# Patient Record
Sex: FEMALE | Race: BLACK OR AFRICAN AMERICAN
[De-identification: names, ages, dates, MRNs, and addresses within clinical notes are randomized per-mention and may not be internally consistent; named-entity substitution may affect disease eponyms.]

---

## 2019-03-19 ENCOUNTER — HOSPITAL ENCOUNTER (EMERGENCY)
Dept: HOSPITAL 92 - ERS | Age: 34
Discharge: HOME | End: 2019-03-19
Payer: COMMERCIAL

## 2019-03-19 ENCOUNTER — HOSPITAL ENCOUNTER (OUTPATIENT)
Dept: HOSPITAL 92 - ERS | Age: 34
Discharge: HOME | End: 2019-03-19
Attending: SURGERY
Payer: SELF-PAY

## 2019-03-19 DIAGNOSIS — K80.11: Primary | ICD-10-CM

## 2019-03-19 DIAGNOSIS — K80.20: Primary | ICD-10-CM

## 2019-03-19 LAB
ALBUMIN SERPL BCG-MCNC: 3.9 G/DL (ref 3.5–5)
ALBUMIN SERPL BCG-MCNC: 4 G/DL (ref 3.5–5)
ALP SERPL-CCNC: 78 U/L (ref 40–150)
ALP SERPL-CCNC: 79 U/L (ref 40–150)
ALT SERPL W P-5'-P-CCNC: 12 U/L (ref 8–55)
ALT SERPL W P-5'-P-CCNC: 13 U/L (ref 8–55)
ANION GAP SERPL CALC-SCNC: 13 MMOL/L (ref 10–20)
ANION GAP SERPL CALC-SCNC: 15 MMOL/L (ref 10–20)
AST SERPL-CCNC: 13 U/L (ref 5–34)
AST SERPL-CCNC: 13 U/L (ref 5–34)
BASOPHILS # BLD AUTO: 0 THOU/UL (ref 0–0.2)
BASOPHILS NFR BLD AUTO: 0.1 % (ref 0–1)
BILIRUB SERPL-MCNC: 0.2 MG/DL (ref 0.2–1.2)
BILIRUB SERPL-MCNC: 0.2 MG/DL (ref 0.2–1.2)
BUN SERPL-MCNC: 6 MG/DL (ref 7–18.7)
BUN SERPL-MCNC: 9 MG/DL (ref 7–18.7)
CALCIUM SERPL-MCNC: 8.7 MG/DL (ref 7.8–10.44)
CALCIUM SERPL-MCNC: 9.4 MG/DL (ref 7.8–10.44)
CHLORIDE SERPL-SCNC: 107 MMOL/L (ref 98–107)
CHLORIDE SERPL-SCNC: 107 MMOL/L (ref 98–107)
CO2 SERPL-SCNC: 18 MMOL/L (ref 22–29)
CO2 SERPL-SCNC: 19 MMOL/L (ref 22–29)
CREAT CL PREDICTED SERPL C-G-VRATE: 0 ML/MIN (ref 70–130)
CREAT CL PREDICTED SERPL C-G-VRATE: 0 ML/MIN (ref 70–130)
EOSINOPHIL # BLD AUTO: 0 THOU/UL (ref 0–0.7)
EOSINOPHIL NFR BLD AUTO: 0.2 % (ref 0–10)
GLOBULIN SER CALC-MCNC: 3.3 G/DL (ref 2.4–3.5)
GLOBULIN SER CALC-MCNC: 3.5 G/DL (ref 2.4–3.5)
GLUCOSE SERPL-MCNC: 115 MG/DL (ref 70–105)
GLUCOSE SERPL-MCNC: 124 MG/DL (ref 70–105)
HGB BLD-MCNC: 13.7 G/DL (ref 12–16)
HGB BLD-MCNC: 14.6 G/DL (ref 12–16)
LIPASE SERPL-CCNC: 4 U/L (ref 8–78)
LIPASE SERPL-CCNC: 9 U/L (ref 8–78)
LYMPHOCYTES # BLD: 1.6 THOU/UL (ref 1.2–3.4)
LYMPHOCYTES NFR BLD AUTO: 14.7 % (ref 21–51)
MCH RBC QN AUTO: 30.2 PG (ref 27–31)
MCH RBC QN AUTO: 30.4 PG (ref 27–31)
MCV RBC AUTO: 88.5 FL (ref 78–98)
MCV RBC AUTO: 90.2 FL (ref 78–98)
MDIFF COMPLETE?: YES
MONOCYTES # BLD AUTO: 0.5 THOU/UL (ref 0.11–0.59)
MONOCYTES NFR BLD AUTO: 4 % (ref 0–10)
NEUTROPHILS # BLD AUTO: 9 THOU/UL (ref 1.4–6.5)
NEUTROPHILS NFR BLD AUTO: 80.9 % (ref 42–75)
PLATELET # BLD AUTO: 375 THOU/UL (ref 130–400)
PLATELET # BLD AUTO: 412 THOU/UL (ref 130–400)
POTASSIUM SERPL-SCNC: 3.3 MMOL/L (ref 3.5–5.1)
POTASSIUM SERPL-SCNC: 3.8 MMOL/L (ref 3.5–5.1)
PREGS CONTROL BACKGROUND?: (no result)
PREGS CONTROL BACKGROUND?: (no result)
PREGS CONTROL BAR APPEAR?: YES
PREGS CONTROL BAR APPEAR?: YES
RBC # BLD AUTO: 4.55 MILL/UL (ref 4.2–5.4)
RBC # BLD AUTO: 4.8 MILL/UL (ref 4.2–5.4)
SODIUM SERPL-SCNC: 135 MMOL/L (ref 136–145)
SODIUM SERPL-SCNC: 137 MMOL/L (ref 136–145)
WBC # BLD AUTO: 10.3 THOU/UL (ref 4.8–10.8)
WBC # BLD AUTO: 11.1 THOU/UL (ref 4.8–10.8)

## 2019-03-19 PROCEDURE — 84703 CHORIONIC GONADOTROPIN ASSAY: CPT

## 2019-03-19 PROCEDURE — 85025 COMPLETE CBC W/AUTO DIFF WBC: CPT

## 2019-03-19 PROCEDURE — 83690 ASSAY OF LIPASE: CPT

## 2019-03-19 PROCEDURE — 96374 THER/PROPH/DIAG INJ IV PUSH: CPT

## 2019-03-19 PROCEDURE — 96376 TX/PRO/DX INJ SAME DRUG ADON: CPT

## 2019-03-19 PROCEDURE — 96375 TX/PRO/DX INJ NEW DRUG ADDON: CPT

## 2019-03-19 PROCEDURE — 76705 ECHO EXAM OF ABDOMEN: CPT

## 2019-03-19 PROCEDURE — 96361 HYDRATE IV INFUSION ADD-ON: CPT

## 2019-03-19 PROCEDURE — S0028 INJECTION, FAMOTIDINE, 20 MG: HCPCS

## 2019-03-19 PROCEDURE — 80053 COMPREHEN METABOLIC PANEL: CPT

## 2019-03-19 PROCEDURE — 0FT44ZZ RESECTION OF GALLBLADDER, PERCUTANEOUS ENDOSCOPIC APPROACH: ICD-10-PCS | Performed by: SURGERY

## 2019-03-19 PROCEDURE — 96365 THER/PROPH/DIAG IV INF INIT: CPT

## 2019-03-19 PROCEDURE — 36415 COLL VENOUS BLD VENIPUNCTURE: CPT

## 2019-03-19 PROCEDURE — 88304 TISSUE EXAM BY PATHOLOGIST: CPT

## 2019-03-19 NOTE — HP
HISTORY OF PRESENT ILLNESS:  Jodi E. Cooks is a 34-year-old female,  2, para

2 with a 1-year-old, having epigastric right upper quadrant pain intermittently for

the past month, but more severe in the last few days.  She presented to the

emergency room early this morning, was found to have gallstones, normal bile duct

caliber, normal function tests, and sent home.  She returned shortly after with

intolerable pain.  She was called stating that she had acute cholecystitis,

tenderness. 



ALLERGIES:  NONE.



SOCIAL HISTORY:  Tobacco, none.  Alcohol, none.  The patient works at the [employer].

She is a mother of two. 



MEDICATIONS:  Oral contraceptives.



PAST SURGICAL HISTORY:  D and C.



PAST MEDICAL HISTORY:  Noncontributory.



REVIEW OF SYSTEMS:  Noncontributory.



FAMILY HISTORY:  Noncontributory.



PHYSICAL EXAMINATION:

VITAL SIGNS:  Weight 65 kg.  Respiratory rate 22, heart rate 90, and 98% on room

air. 

LUNGS:  Clear to auscultation. 

CARDIAC:  Regular rate and rhythm.  No murmur or gallop. 

ABDOMEN:  Soft and tenderness in her right upper quadrant, guarding, rebound.

Positive Weiss's. 

EXTREMITIES:  Unremarkable.  Ultrasound of gallstones, positive sonographic Weiss's

noted.  Bile duct normal caliber. 



LABORATORY DATA:  White count 10 and hemoglobin 14.  Liver function tests normal.



ASSESSMENT AND PLAN:  Cholecystitis, cholelithiasis.  Recommend laparoscopic video

cholecystectomy.  Risks of infection, bleeding, visceral and biliary injury

explained.  Questions answered.  Plan laparoscopic cholecystectomy days in

outpatient. 







Job ID:  [id number]

## 2019-03-19 NOTE — ULT
GALLBLADDER ULTRASOUND:

 

INDICATIONS:

Epigastric pain with nausea and vomiting.

 

FINDINGS:

No focal hepatic lesion.  There is shadowing cholelithiasis.  The visualized gallbladder wall is norm
al in caliber, as is the imaged common duct.  Weiss sign is reported as positive by the sonographer.
  No ascites is visualized.

 

IMPRESSION:

Cholelithiasis.  A positive Weiss sign is elicited.  Recommend correlation for evidence of associate
d cholecystitis.

 

POS: LIZZIE

## 2019-03-19 NOTE — OP
DATE OF PROCEDURE:  03/19/2019



PREOPERATIVE DIAGNOSES:  Cholecystitis, cholelithiasis, and cystic duct occlusion

with a stone. 



POSTOPERATIVE DIAGNOSES:  Cholecystitis, cholelithiasis, and cystic duct occlusion

with a stone. 



PROCEDURE PERFORMED:  Laparoscopic video cholecystectomy.



ANESTHESIA:  General, local 0.5% Marcaine with epinephrine 30 mL.



DESCRIPTION OF PROCEDURE:  The patient was taken to the operating room, where under

general anesthesia, abdomen was prepared with ChloraPrep and draped in routine

fashion.  Local anesthetic with 0.5% Marcaine with epinephrine was infiltrated in

the skin and subcutaneous tissue at each port site, total volume mixture used 30 mL.

 Infraumbilical incision was made.  Pneumoperitoneum to 15 mmHg was obtained with a

Veress needle, replaced with a 5 port laparoscope inserted.  Right subxiphoid

incision was made and 11 port placed, right subcostal incision was made,

midclavicular and anterior axillary line, the 5 port was placed.  Fundus of the

gallbladder was grasped at the cephalad.  Infundibulum was grasped and reflected

laterally.  Cystic artery and duct dissected free.  Cystic duct opening made

adjacent to the gallbladder and the stone obstructing the cystic duct milked out.

Cystic duct was not doubly clipped and divided.  Gallbladder dissected free from

liver bed obtaining good hemostasis prior to division of final peritoneal

attachments.  Gallbladder and contents removed, submitted to Pathology.  Good

hemostasis ensured.  Endobag was used to remove the gallbladder.  Irrigant and

pneumoperitoneum evacuated.  All instruments were removed.  All skin incisions were

approximated with interrupted subdermal 4-0 Monocryl and Derma glue applied. 







Job ID:  [id number]